# Patient Record
Sex: MALE | Race: WHITE | NOT HISPANIC OR LATINO | Employment: OTHER
[De-identification: names, ages, dates, MRNs, and addresses within clinical notes are randomized per-mention and may not be internally consistent; named-entity substitution may affect disease eponyms.]

---

## 2017-06-21 PROBLEM — K21.00 HIATAL HERNIA WITH GERD AND ESOPHAGITIS: Status: ACTIVE | Noted: 2017-06-21

## 2017-06-21 PROBLEM — K44.9 HIATAL HERNIA WITH GERD AND ESOPHAGITIS: Status: ACTIVE | Noted: 2017-06-21

## 2017-08-08 PROBLEM — Z09 POSTOPERATIVE EXAMINATION: Status: ACTIVE | Noted: 2017-08-08

## 2018-04-04 PROBLEM — K14.6 SORENESS OF TONGUE: Status: ACTIVE | Noted: 2018-04-04

## 2018-08-15 PROBLEM — K21.9 GASTROESOPHAGEAL REFLUX DISEASE WITHOUT ESOPHAGITIS: Status: ACTIVE | Noted: 2018-08-15

## 2019-09-30 PROBLEM — Z12.11 ENCOUNTER FOR SCREENING COLONOSCOPY: Status: ACTIVE | Noted: 2019-09-30

## 2019-09-30 PROBLEM — Z80.0 FAMILY HISTORY OF COLON CANCER IN FATHER: Status: ACTIVE | Noted: 2019-09-30

## 2019-11-12 ENCOUNTER — HOSPITAL (OUTPATIENT)
Dept: OTHER | Age: 74
End: 2019-11-12
Attending: OTOLARYNGOLOGY

## 2019-11-14 ENCOUNTER — HOSPITAL (OUTPATIENT)
Dept: OTHER | Age: 74
End: 2019-11-14

## 2019-11-29 LAB — PATHOLOGIST NAME: NORMAL

## 2021-03-05 DIAGNOSIS — Z23 NEED FOR VACCINATION: ICD-10-CM

## 2022-01-12 PROBLEM — R49.0 DYSPHONIA: Status: ACTIVE | Noted: 2022-01-12

## 2022-01-12 PROBLEM — J31.0 CHRONIC RHINITIS: Status: ACTIVE | Noted: 2022-01-12

## 2022-01-12 PROBLEM — E78.5 HYPERLIPIDEMIA: Status: ACTIVE | Noted: 2017-07-29

## 2022-01-12 PROBLEM — C07 MALIGNANT TUMOR OF PAROTID GLAND (HCC): Status: ACTIVE | Noted: 2022-01-12

## 2022-01-12 PROBLEM — K57.30 DIVERTICULOSIS OF LARGE INTESTINE WITHOUT HEMORRHAGE: Status: ACTIVE | Noted: 2019-11-07

## 2022-01-12 PROBLEM — H65.20 CHRONIC SEROUS OTITIS MEDIA: Status: ACTIVE | Noted: 2022-01-12

## 2022-01-12 PROBLEM — D72.819 LEUKOPENIA: Status: ACTIVE | Noted: 2017-07-29

## 2022-01-12 PROBLEM — H90.3 SENSORINEURAL HEARING LOSS, BILATERAL: Status: ACTIVE | Noted: 2022-01-12

## 2022-01-12 PROBLEM — K14.0 GLOSSITIS: Status: ACTIVE | Noted: 2022-01-12

## 2022-01-12 PROBLEM — H61.20 IMPACTED CERUMEN: Status: ACTIVE | Noted: 2022-01-12

## 2022-01-17 ENCOUNTER — HOSPITAL ENCOUNTER (OUTPATIENT)
Dept: LAB | Age: 77
Discharge: HOME | End: 2022-01-17
Attending: INTERNAL MEDICINE

## 2022-01-17 ENCOUNTER — HOSPITAL ENCOUNTER (OUTPATIENT)
Dept: CARDIOLOGY | Age: 77
Discharge: HOME | End: 2022-01-17
Attending: INTERNAL MEDICINE

## 2022-01-17 DIAGNOSIS — R06.02 SHORTNESS OF BREATH: Primary | ICD-10-CM

## 2022-01-17 DIAGNOSIS — R06.02 SOB (SHORTNESS OF BREATH): ICD-10-CM

## 2022-01-17 LAB — NT-PROBNP SERPL-MCNC: 145 PG/ML

## 2022-01-17 PROCEDURE — 93306 TTE W/DOPPLER COMPLETE: CPT

## 2022-01-17 PROCEDURE — 86480 TB TEST CELL IMMUN MEASURE: CPT | Performed by: INTERNAL MEDICINE

## 2022-01-17 PROCEDURE — 93306 TTE W/DOPPLER COMPLETE: CPT | Performed by: INTERNAL MEDICINE

## 2022-01-17 PROCEDURE — 36415 COLL VENOUS BLD VENIPUNCTURE: CPT | Performed by: INTERNAL MEDICINE

## 2022-01-17 PROCEDURE — 83880 ASSAY OF NATRIURETIC PEPTIDE: CPT | Performed by: INTERNAL MEDICINE

## 2022-01-18 LAB
GAMMA INTERFERON BACKGROUND BLD IA-ACNC: 0.05 IU/ML
M TB IFN-G BLD-IMP: NEGATIVE
M TB IFN-G CD4+ BCKGRND COR BLD-ACNC: 0.01 IU/ML
M TB IFN-G CD4+CD8+ BCKGRND COR BLD-ACNC: 0.01 IU/ML
MITOGEN IGNF BCKGRD COR BLD-ACNC: 2.5 IU/ML

## 2022-01-19 ENCOUNTER — LAB SERVICES (OUTPATIENT)
Dept: LAB | Age: 77
End: 2022-01-19

## 2022-01-19 DIAGNOSIS — Z01.812 PRE-PROCEDURAL LABORATORY EXAMINATION: Primary | ICD-10-CM

## 2022-01-19 PROCEDURE — U0003 INFECTIOUS AGENT DETECTION BY NUCLEIC ACID (DNA OR RNA); SEVERE ACUTE RESPIRATORY SYNDROME CORONAVIRUS 2 (SARS-COV-2) (CORONAVIRUS DISEASE [COVID-19]), AMPLIFIED PROBE TECHNIQUE, MAKING USE OF HIGH THROUGHPUT TECHNOLOGIES AS DESCRIBED BY CMS-2020-01-R: HCPCS | Performed by: CLINICAL MEDICAL LABORATORY

## 2022-01-19 PROCEDURE — U0005 INFEC AGEN DETEC AMPLI PROBE: HCPCS | Performed by: CLINICAL MEDICAL LABORATORY

## 2022-01-20 LAB
SARS-COV-2 RNA RESP QL NAA+PROBE: NOT DETECTED
SERVICE CMNT-IMP: NORMAL
SERVICE CMNT-IMP: NORMAL

## 2022-01-21 ENCOUNTER — HOSPITAL ENCOUNTER (OUTPATIENT)
Dept: RESPIRATORY THERAPY | Age: 77
Discharge: HOME | End: 2022-01-21
Attending: INTERNAL MEDICINE

## 2022-01-21 ENCOUNTER — EXTERNAL RECORD (OUTPATIENT)
Dept: HEALTH INFORMATION MANAGEMENT | Facility: OTHER | Age: 77
End: 2022-01-21

## 2022-01-21 DIAGNOSIS — R06.02 SOB (SHORTNESS OF BREATH): ICD-10-CM

## 2022-01-21 PROCEDURE — 94726 PLETHYSMOGRAPHY LUNG VOLUMES: CPT

## 2022-01-21 PROCEDURE — 94618 PULMONARY STRESS TESTING: CPT

## 2022-01-21 PROCEDURE — 94200 LUNG FUNCTION TEST (MBC/MVV): CPT

## 2022-01-21 PROCEDURE — 94060 EVALUATION OF WHEEZING: CPT

## 2022-01-21 PROCEDURE — 94729 DIFFUSING CAPACITY: CPT

## 2022-01-27 ENCOUNTER — APPOINTMENT (OUTPATIENT)
Dept: RESPIRATORY THERAPY | Age: 77
End: 2022-01-27
Attending: INTERNAL MEDICINE

## 2022-11-04 ENCOUNTER — LAB ENCOUNTER (OUTPATIENT)
Dept: LAB | Age: 77
End: 2022-11-04
Attending: INTERNAL MEDICINE
Payer: MEDICARE

## 2022-11-04 DIAGNOSIS — Z01.812 ENCOUNTER FOR PREPROCEDURE SCREENING LABORATORY TESTING FOR COVID-19: ICD-10-CM

## 2022-11-04 DIAGNOSIS — Z20.822 ENCOUNTER FOR PREPROCEDURE SCREENING LABORATORY TESTING FOR COVID-19: ICD-10-CM

## 2022-11-05 LAB — SARS-COV-2 RNA RESP QL NAA+PROBE: NOT DETECTED

## 2022-11-06 ENCOUNTER — ANESTHESIA EVENT (OUTPATIENT)
Dept: ENDOSCOPY | Facility: HOSPITAL | Age: 77
End: 2022-11-06
Payer: MEDICARE

## 2022-11-07 ENCOUNTER — HOSPITAL ENCOUNTER (OUTPATIENT)
Facility: HOSPITAL | Age: 77
Setting detail: HOSPITAL OUTPATIENT SURGERY
Discharge: HOME | End: 2022-11-07
Attending: INTERNAL MEDICINE | Admitting: INTERNAL MEDICINE
Payer: MEDICARE

## 2022-11-07 ENCOUNTER — ANESTHESIA (OUTPATIENT)
Dept: ENDOSCOPY | Facility: HOSPITAL | Age: 77
End: 2022-11-07
Payer: MEDICARE

## 2022-11-07 VITALS
TEMPERATURE: 98 F | BODY MASS INDEX: 21.47 KG/M2 | OXYGEN SATURATION: 97 % | RESPIRATION RATE: 18 BRPM | HEIGHT: 70 IN | SYSTOLIC BLOOD PRESSURE: 88 MMHG | DIASTOLIC BLOOD PRESSURE: 56 MMHG | HEART RATE: 61 BPM | WEIGHT: 150 LBS

## 2022-11-07 DIAGNOSIS — Z01.812 ENCOUNTER FOR PREPROCEDURE SCREENING LABORATORY TESTING FOR COVID-19: Primary | ICD-10-CM

## 2022-11-07 DIAGNOSIS — Z20.822 ENCOUNTER FOR PREPROCEDURE SCREENING LABORATORY TESTING FOR COVID-19: Primary | ICD-10-CM

## 2022-11-07 PROCEDURE — 0DB78ZX EXCISION OF STOMACH, PYLORUS, VIA NATURAL OR ARTIFICIAL OPENING ENDOSCOPIC, DIAGNOSTIC: ICD-10-PCS | Performed by: INTERNAL MEDICINE

## 2022-11-07 PROCEDURE — 88305 TISSUE EXAM BY PATHOLOGIST: CPT | Performed by: INTERNAL MEDICINE

## 2022-11-07 RX ORDER — SODIUM CHLORIDE, SODIUM LACTATE, POTASSIUM CHLORIDE, CALCIUM CHLORIDE 600; 310; 30; 20 MG/100ML; MG/100ML; MG/100ML; MG/100ML
INJECTION, SOLUTION INTRAVENOUS CONTINUOUS
Status: DISCONTINUED | OUTPATIENT
Start: 2022-11-07 | End: 2022-11-07

## 2022-11-07 RX ORDER — LIDOCAINE HYDROCHLORIDE 10 MG/ML
INJECTION, SOLUTION EPIDURAL; INFILTRATION; INTRACAUDAL; PERINEURAL AS NEEDED
Status: DISCONTINUED | OUTPATIENT
Start: 2022-11-07 | End: 2022-11-07 | Stop reason: SURG

## 2022-11-07 RX ADMIN — LIDOCAINE HYDROCHLORIDE 25 MG: 10 INJECTION, SOLUTION EPIDURAL; INFILTRATION; INTRACAUDAL; PERINEURAL at 10:47:00

## 2022-11-07 RX ADMIN — SODIUM CHLORIDE, SODIUM LACTATE, POTASSIUM CHLORIDE, CALCIUM CHLORIDE: 600; 310; 30; 20 INJECTION, SOLUTION INTRAVENOUS at 10:46:00

## 2022-11-07 NOTE — DISCHARGE INSTRUCTIONS
FINDINGS:  1. The Nissen wrap is intact. 2.  I did not find any acid reflux inflammation. 3.  I took routine biopsies of the stomach. 4.  The BRAVO acid probe was successfully placed. PLAN:  1. Await the biopsy results by the end of the week. 2.  Complete a 4 day BRAVO test.      If you have any questions, please call us at [phone]. Thank you,  Cali Vargas MD                  Home Care Instructions for Gastroscopy with Sedation    Diet:  - Resume your regular diet as tolerated unless otherwise instructed. - Start with light meals to minimize bloating.  - Do not drink alcohol today. Medication:  - If you have questions about resuming your normal medications, please contact your Primary Care Physician. Activities:  - Take it easy today. Do not return to work today. - Do not drive today. - Do not operate any machinery today (including kitchen equipment). Gastroscopy:  - You may have a sore throat for 2-3 days following the exam. This is normal. Gargling with warm salt water (1/2 tsp salt to 1 glass warm water) or using throat lozenges will help. - If you experience any sharp pain in your neck, abdomen or chest, vomiting of blood, oral temperature over 100 degrees Fahrenheit, light-headedness or dizziness, or any other problems, contact your doctor. **If unable to reach your doctor, please go to the [facility] Emergency Room**    - Your referring physician will receive a full report of your examination.  - If you do not hear from your doctor's office within two weeks of your biopsy, please call them for your results.

## 2022-11-07 NOTE — ANESTHESIA POSTPROCEDURE EVALUATION
[address] Patient Status:  Hospital Outpatient Surgery   Age/Gender 68year old male MRN [id number]   Location [address] [address] Attending Sha Flood MD   Hosp Day # 0  [address]       Anesthesia Post-op Note    ESOPHAGOGASTRODUODENOSCOPY WITH 80 HOUR BRAVO and biopsies    Procedure Summary     Date: 11/07/22 Room / Location: [facility] / [facility]    Anesthesia Start: 5927 Anesthesia Stop: 7903    Procedure: ESOPHAGOGASTRODUODENOSCOPY WITH 80 HOUR BRAVO and biopsies Diagnosis: (Gastritis, bravo placement)    Surgeons: Sha Flood MD Anesthesiologist:     Anesthesia Type: MAC ASA Status: 2          Anesthesia Type: MAC    Vitals Value Taken Time   BP 97/58 11/07/22 1112   Temp 98.0 11/07/22 1117   Pulse 64 11/07/22 1110   Resp 18 11/07/22 1105   SpO2 100 % 11/07/22 1110   Vitals shown include unvalidated device data. Patient Location: Endoscopy    Anesthesia Type: MAC    Airway Patency: patent    Postop Pain Control: adequate    Mental Status: mildly sedated but able to meaningfully participate in the post-anesthesia evaluation    Nausea/Vomiting: none    Cardiopulmonary/Hydration status: stable euvolemic    Complications: no apparent anesthesia related complications    Postop vital signs: stable    Dental Exam: Unchanged from Preop    Patient to be discharged home when criteria met.

## 2023-01-16 PROBLEM — J84.10 PULMONARY FIBROSIS (CMD): Status: ACTIVE | Noted: 2023-01-16

## 2023-01-16 PROBLEM — R91.8 ABNORMAL FINDING ON LUNG IMAGING: Status: ACTIVE | Noted: 2023-01-16

## 2023-01-18 ENCOUNTER — LAB SERVICES (OUTPATIENT)
Dept: LAB | Age: 78
End: 2023-01-18

## 2023-01-18 ENCOUNTER — HOSPITAL ENCOUNTER (OUTPATIENT)
Dept: RESPIRATORY THERAPY | Age: 78
Discharge: HOME | End: 2023-01-18
Attending: INTERNAL MEDICINE

## 2023-01-18 DIAGNOSIS — J84.10 PULMONARY FIBROSIS (CMD): ICD-10-CM

## 2023-01-18 DIAGNOSIS — R06.09 DYSPNEA ON EXERTION: ICD-10-CM

## 2023-01-18 DIAGNOSIS — R06.09 DYSPNEA ON EXERTION: Primary | ICD-10-CM

## 2023-01-18 PROCEDURE — 94618 PULMONARY STRESS TESTING: CPT

## 2023-01-18 PROCEDURE — 10002801 HB RX 250 W/O HCPCS: Performed by: INTERNAL MEDICINE

## 2023-01-18 PROCEDURE — 94726 PLETHYSMOGRAPHY LUNG VOLUMES: CPT

## 2023-01-18 PROCEDURE — 82164 ANGIOTENSIN I ENZYME TEST: CPT | Performed by: CLINICAL MEDICAL LABORATORY

## 2023-01-18 PROCEDURE — 94060 EVALUATION OF WHEEZING: CPT

## 2023-01-18 PROCEDURE — 36415 COLL VENOUS BLD VENIPUNCTURE: CPT | Performed by: CLINICAL MEDICAL LABORATORY

## 2023-01-18 PROCEDURE — 94729 DIFFUSING CAPACITY: CPT

## 2023-01-18 RX ORDER — ALBUTEROL SULFATE 2.5 MG/3ML
2.5 SOLUTION RESPIRATORY (INHALATION) ONCE
Status: COMPLETED | OUTPATIENT
Start: 2023-01-18 | End: 2023-01-18

## 2023-01-18 RX ADMIN — ALBUTEROL SULFATE 2.5 MG: 2.5 SOLUTION RESPIRATORY (INHALATION) at 08:35

## 2023-01-19 LAB — ACE SERPL-CCNC: 79 U/L (ref 16–85)

## 2023-01-20 PROBLEM — J30.89 NON-SEASONAL ALLERGIC RHINITIS: Status: ACTIVE | Noted: 2023-01-20

## 2023-01-20 PROBLEM — K21.9 GASTRO-ESOPHAGEAL REFLUX DISEASE WITHOUT ESOPHAGITIS: Status: ACTIVE | Noted: 2023-01-20

## 2023-01-20 PROBLEM — M79.89 LEG SWELLING: Status: ACTIVE | Noted: 2023-01-20

## 2023-01-20 PROBLEM — R06.09 DYSPNEA ON EXERTION: Status: ACTIVE | Noted: 2023-01-20

## 2023-02-24 ENCOUNTER — LAB SERVICES (OUTPATIENT)
Dept: LAB | Age: 78
End: 2023-02-24

## 2023-02-24 DIAGNOSIS — J30.89 NON-SEASONAL ALLERGIC RHINITIS, UNSPECIFIED TRIGGER: ICD-10-CM

## 2023-02-24 DIAGNOSIS — R06.09 DYSPNEA ON EXERTION: ICD-10-CM

## 2023-02-24 DIAGNOSIS — J84.10 PULMONARY FIBROSIS (CMD): ICD-10-CM

## 2023-02-24 LAB
IGE SERPL-ACNC: 10.8 IUNITS/ML
NT-PROBNP SERPL-MCNC: 181 PG/ML
RHEUMATOID FACT SER NEPH-ACNC: <10 UNITS/ML

## 2023-02-24 PROCEDURE — 86331 IMMUNODIFFUSION OUCHTERLONY: CPT | Performed by: CLINICAL MEDICAL LABORATORY

## 2023-02-24 PROCEDURE — 83880 ASSAY OF NATRIURETIC PEPTIDE: CPT | Performed by: CLINICAL MEDICAL LABORATORY

## 2023-02-24 PROCEDURE — 86698 HISTOPLASMA ANTIBODY: CPT | Performed by: CLINICAL MEDICAL LABORATORY

## 2023-02-24 PROCEDURE — 86606 ASPERGILLUS ANTIBODY: CPT | Performed by: CLINICAL MEDICAL LABORATORY

## 2023-02-24 PROCEDURE — 86612 BLASTOMYCES ANTIBODY: CPT | Performed by: CLINICAL MEDICAL LABORATORY

## 2023-02-24 PROCEDURE — 86038 ANTINUCLEAR ANTIBODIES: CPT | Performed by: CLINICAL MEDICAL LABORATORY

## 2023-02-24 PROCEDURE — 86635 COCCIDIOIDES ANTIBODY: CPT | Performed by: CLINICAL MEDICAL LABORATORY

## 2023-02-24 PROCEDURE — 87385 HISTOPLASMA CAPSUL AG IA: CPT | Performed by: CLINICAL MEDICAL LABORATORY

## 2023-02-24 PROCEDURE — 86609 BACTERIUM ANTIBODY: CPT | Performed by: CLINICAL MEDICAL LABORATORY

## 2023-02-24 PROCEDURE — 86003 ALLG SPEC IGE CRUDE XTRC EA: CPT | Performed by: CLINICAL MEDICAL LABORATORY

## 2023-02-24 PROCEDURE — 86001 ALLERGEN SPECIFIC IGG: CPT | Performed by: CLINICAL MEDICAL LABORATORY

## 2023-02-24 PROCEDURE — 86431 RHEUMATOID FACTOR QUANT: CPT | Performed by: CLINICAL MEDICAL LABORATORY

## 2023-02-24 PROCEDURE — 36415 COLL VENOUS BLD VENIPUNCTURE: CPT | Performed by: CLINICAL MEDICAL LABORATORY

## 2023-02-24 PROCEDURE — 82785 ASSAY OF IGE: CPT | Performed by: CLINICAL MEDICAL LABORATORY

## 2023-02-26 LAB
H CAPSUL AG UR QL IA: NOT DETECTED
H CAPSUL AG UR-MCNC: NOT DETECTED NG/ML

## 2023-02-27 LAB
A ALTERNATA IGE QN: <0.35 KU/L
A FUMIGATUS IGE QN: <0.35 KU/L
AMER ROACH IGE QN: <0.35 KU/L
ANA SER QL IA: NEGATIVE
B DERMAT AB SER-ACNC: 0.7 IV
BERMUDA GRASS IGE QN: <0.35 KU/L
BOXELDER IGE QN: <0.35 KU/L
C HERBARUM IGE QN: <0.35 KU/L
CALIF WALNUT POLN IGE QN: <0.35 KU/L
CAT DANDER IGE QN: <0.35 KU/L
CMN PIGWEED IGE QN: <0.35 KU/L
COCCIDIOIDES IGG SER-ACNC: 0.1 IV
COCCIDIOIDES IGM SERPL-ACNC: 0.1 IV
COMMON RAGWEED IGE QN: <0.35 KU/L
COTTONWOOD IGE QN: <0.35 KU/L
D FARINAE IGE QN: <0.35 KU/L
D PTERONYSS IGE QN: <0.35 KU/L
DEPRECATED A ALTERNATA IGE RAST QL: NORMAL
DEPRECATED A FUMIGATUS IGE RAST QL: NORMAL
DEPRECATED BERMUDA GRASS IGE RAST QL: NORMAL
DEPRECATED BOXELDER IGE RAST QL: NORMAL
DEPRECATED C HERBARUM IGE RAST QL: NORMAL
DEPRECATED CALIF WALNUT POLN IGE RAST QL: NORMAL
DEPRECATED CAT DANDER IGE RAST QL: NORMAL
DEPRECATED COMMON PIGWEED IGE RAST QL: NORMAL
DEPRECATED COMMON RAGWEED IGE RAST QL: NORMAL
DEPRECATED COTTONWOOD IGE RAST QL: NORMAL
DEPRECATED D FARINAE IGE RAST QL: NORMAL
DEPRECATED D PTERONYSS IGE RAST QL: NORMAL
DEPRECATED DOG DANDER IGE RAST QL: NORMAL
DEPRECATED LONDON PLANE (RPLA A) 1 IGE RAST QL: NORMAL
DEPRECATED M RACEMOSUS IGE RAST QL: NORMAL
DEPRECATED MARSH ELDER IGE RAST QL: NORMAL
DEPRECATED MOUSE EPITH IGE RAST QL: NORMAL
DEPRECATED MT JUNIPER IGE RAST QL: NORMAL
DEPRECATED P NOTATUM IGE RAST QL: NORMAL
DEPRECATED PECAN/HICK TREE IGE RAST QL: NORMAL
DEPRECATED ROACH IGE RAST QL: NORMAL
DEPRECATED SALTWORT IGE RAST QL: NORMAL
DEPRECATED SILVER BIRCH IGE RAST QL: NORMAL
DEPRECATED TIMOTHY IGE RAST QL: NORMAL
DEPRECATED WHITE ASH IGE RAST QL: NORMAL
DEPRECATED WHITE ELM IGE RAST QL: NORMAL
DEPRECATED WHITE MULBERRY IGE RAST QL: NORMAL
DEPRECATED WHITE OAK IGE RAST QL: NORMAL
DOG DANDER IGE QN: <0.35 KU/L
LONDON PLANE IGE QN: <0.35 KU/L
M RACEMOSUS IGE QN: <0.35 KU/L
MARSH ELDER IGE QN: <0.35 KU/L
MOUSE EPITH IGE QN: <0.35 KU/L
MT JUNIPER IGE QN: <0.35 KU/L
P NOTATUM IGE QN: <0.35 KU/L
PECAN/HICK TREE IGE QN: <0.35 KU/L
SALTWORT IGE QN: <0.35 KU/L
SILVER BIRCH IGE QN: <0.35 KU/L
TIMOTHY IGE QN: <0.35 KU/L
WHITE ASH IGE QN: <0.35 KU/L
WHITE ELM IGE QN: <0.35 KU/L
WHITE MULBERRY IGE QN: <0.35 KU/L
WHITE OAK IGE QN: <0.35 KU/L

## 2023-02-28 LAB — H CAPSUL AB SER QL ID: NOT DETECTED

## 2023-03-02 LAB
A FUMIGATUS1 AB SER QL ID: NORMAL
A FUMIGATUS6 AB SER QL ID: NORMAL
A PULLULANS AB SER QL ID: NORMAL
H CAPSUL AG FLD-MCNC: NORMAL NG/ML
PIGEON SERUM AB QL ID: NORMAL
S RECTIVIRGULA AB SER QL ID: NORMAL
T VULGARIS1 AB SER QL ID: NORMAL

## 2023-03-03 LAB
A ALTERNATA IGG SER-MCNC: NORMAL UG/ML
A FUMIGATUS AB SER QL ID: NORMAL
A PULLULANS IGG SER-MCNC: NORMAL UG/ML
C HERBARUM IGG SER-MCNC: NORMAL UG/DL
LACEYELLA SACCHARI AB SER QL ID: NORMAL
P BETAE IGG-MCNC: NORMAL UG/ML
P NOTATUM IGG SER-MCNC: NORMAL UG/ML
S RECTIVIRGULA AB SER QL ID: NORMAL
S VIRIDIS AB SER QL ID: NORMAL
T CANDIDUS AB SER QL: NORMAL
T VIRIDE IGG SER-MCNC: NORMAL UG/ML
T VULGARIS AB SER QL ID: NORMAL

## 2023-03-14 DIAGNOSIS — G47.33 OSA (OBSTRUCTIVE SLEEP APNEA): Primary | ICD-10-CM

## 2023-03-14 PROBLEM — J67.9 HYPERSENSITIVITY PNEUMONITIS (CMD): Status: ACTIVE | Noted: 2023-03-14

## 2023-07-20 ENCOUNTER — TELEPHONE (OUTPATIENT)
Dept: SLEEP MEDICINE | Age: 78
End: 2023-07-20

## 2023-07-20 DIAGNOSIS — G47.33 OSA (OBSTRUCTIVE SLEEP APNEA): Primary | ICD-10-CM

## 2023-08-23 DIAGNOSIS — G47.33 OSA (OBSTRUCTIVE SLEEP APNEA): Primary | ICD-10-CM

## 2023-08-30 ENCOUNTER — OFFICE VISIT (OUTPATIENT)
Dept: SLEEP MEDICINE | Age: 78
End: 2023-08-30

## 2023-08-30 DIAGNOSIS — G47.33 OSA (OBSTRUCTIVE SLEEP APNEA): Primary | ICD-10-CM

## 2023-08-30 LAB — REPORT TEXT: NORMAL

## 2023-08-30 PROCEDURE — 95810 POLYSOM 6/> YRS 4/> PARAM: CPT | Performed by: SPECIALIST

## (undated) DEVICE — 1200CC GUARDIAN II: Brand: GUARDIAN

## (undated) DEVICE — Device: Brand: DEFENDO AIR/WATER/SUCTION AND BIOPSY VALVE

## (undated) DEVICE — FORCEP RADIAL JAW 4

## (undated) DEVICE — ENDOSCOPY PACK UPPER: Brand: MEDLINE INDUSTRIES, INC.

## (undated) DEVICE — FILTERLINE NASAL ADULT O2/CO2

## (undated) DEVICE — DEVICE DELIVERY CAPSUL F/MONI

## (undated) DEVICE — 3M™ RED DOT™ MONITORING ELECTRODE WITH FOAM TAPE AND STICKY GEL, 50/BAG, 20/CASE, 72/PLT 2570: Brand: RED DOT™